# Patient Record
Sex: MALE | Race: WHITE | NOT HISPANIC OR LATINO | Employment: OTHER | URBAN - METROPOLITAN AREA
[De-identification: names, ages, dates, MRNs, and addresses within clinical notes are randomized per-mention and may not be internally consistent; named-entity substitution may affect disease eponyms.]

---

## 2022-03-23 ENCOUNTER — HOSPITAL ENCOUNTER (EMERGENCY)
Facility: HOSPITAL | Age: 77
Discharge: HOME | End: 2022-03-24
Attending: EMERGENCY MEDICINE | Admitting: EMERGENCY MEDICINE

## 2022-03-23 DIAGNOSIS — K43.9 VENTRAL HERNIA WITHOUT OBSTRUCTION OR GANGRENE: Primary | ICD-10-CM

## 2022-03-23 PROCEDURE — 99282 EMERGENCY DEPT VISIT SF MDM: CPT

## 2022-03-23 PROCEDURE — 99211 OFF/OP EST MAY X REQ PHY/QHP: CPT

## 2022-03-23 PROCEDURE — 99283 EMERGENCY DEPT VISIT LOW MDM: CPT

## 2022-03-23 RX ORDER — DULOXETIN HYDROCHLORIDE 30 MG/1
30 CAPSULE, DELAYED RELEASE ORAL DAILY
COMMUNITY

## 2022-03-23 RX ORDER — NITROGLYCERIN 0.4 MG/1
0.4 TABLET SUBLINGUAL
COMMUNITY

## 2022-03-23 RX ORDER — FUROSEMIDE 20 MG/1
20 TABLET ORAL 2 TIMES DAILY
COMMUNITY

## 2022-03-23 RX ORDER — ATORVASTATIN CALCIUM 40 MG/1
40 TABLET, FILM COATED ORAL DAILY
COMMUNITY

## 2022-03-23 RX ORDER — POTASSIUM CHLORIDE 1.5 G/1.77G
20 POWDER, FOR SOLUTION ORAL DAILY
COMMUNITY

## 2022-03-23 RX ORDER — TRAZODONE HYDROCHLORIDE 50 MG/1
50 TABLET ORAL DAILY
COMMUNITY

## 2022-03-24 VITALS
WEIGHT: 226 LBS | BODY MASS INDEX: 38.58 KG/M2 | DIASTOLIC BLOOD PRESSURE: 76 MMHG | OXYGEN SATURATION: 91 % | SYSTOLIC BLOOD PRESSURE: 114 MMHG | RESPIRATION RATE: 18 BRPM | TEMPERATURE: 97.5 F | HEIGHT: 64 IN | HEART RATE: 67 BPM

## 2022-03-24 RX ORDER — SODIUM CHLORIDE 0.9 % (FLUSH) 0.9 %
10 SYRINGE (ML) INJECTION AS NEEDED
Status: DISCONTINUED | OUTPATIENT
Start: 2022-03-24 | End: 2022-03-24 | Stop reason: HOSPADM

## 2022-03-24 NOTE — ED PROVIDER NOTES
"Time: 12:29 AM EDT  Arrived by: Wheelchair  Chief Complaint: Hernia    History of Present Illness:    Charly Pierson is a 76 y.o. male who presents to the emergency department today with complaints of a hernia. The patient reports that he has a longstanding history of hernias. He states that his hernia popped out this morning, but that he was able to pop it back in on his own. He adds that this has occurred in the past and that he has always been able to resolve it independently.    This evening PTA (now 2+ hours ago), the patient states that he \"moved funny\" and his hernia popped out again. He has been unable to fix issue on his own, which prompted his ED visit. He notes that he has been vomiting and continues to be in a moderate amount of pain. He also states that his discomfort extends from the abdomen up to the chest.    In addition to his hernia, the patient also has a history of atrial fibrillation, hypertension, skin cancer, and COPD. The patient denies smoking or drinking alcohol. There are no other acute complaints at this time.      History provided by:  Patient   used: No    Hernia  Location:  Abdomen/Chest  Quality:  Hernia popped out  Severity:  Moderate  Onset quality:  Sudden  Duration:  2 hours  Timing:  Constant  Progression:  Unchanged  Chronicity:  Recurrent  Context:  Patient has a history of hernias but is usually able to pop them back in on his own. He states that he \"moved funny\" and his hernia popped out. He has been unable to fix it on his own.  Relieved by:  Nothing  Worsened by:  Nothing  Ineffective treatments:  N/A  Associated symptoms: abdominal pain, chest pain and vomiting    Associated symptoms: no cough, no diarrhea, no fever, no rash and no shortness of breath    Risk factors:  History of hernias.      Similar Symptoms Previously: Yes.  Recently seen: No recent visits on file.      Patient Care Team  Primary Care Provider: Provider, No Known    Past Medical " History:     No Known Allergies  Past Medical History:   Diagnosis Date   • Cancer (HCC)     skin   • Chronic a-fib (HCC)    • COPD (chronic obstructive pulmonary disease) (HCC)    • Hypertension    • Umbilical hernia      Past Surgical History:   Procedure Laterality Date   • HERNIA REPAIR     • RECTAL PROLAPSE REPAIR     • SKIN CANCER EXCISION       History reviewed. No pertinent family history.    Home Medications:  Prior to Admission medications    Medication Sig Start Date End Date Taking? Authorizing Provider   apixaban (ELIQUIS) 5 MG tablet tablet Take 5 mg by mouth 2 (Two) Times a Day.    Veronica Enrique MD   atorvastatin (LIPITOR) 40 MG tablet Take 40 mg by mouth Daily.    Veronica Enrique MD   dilTIAZem (CARDIZEM) 30 MG tablet Take 30 mg by mouth Daily.    Veronica Enrique MD   DULoxetine (CYMBALTA) 30 MG capsule Take 30 mg by mouth Daily.    Veronica Enrique MD   furosemide (LASIX) 20 MG tablet Take 20 mg by mouth 2 (Two) Times a Day. 2 tablets in the morning and 1 at night    Veronica Enrique MD   nitroglycerin (NITROSTAT) 0.4 MG SL tablet Place 0.4 mg under the tongue.    Veronica Enrique MD   potassium chloride (KLOR-CON) 20 MEQ packet Take 20 mEq by mouth Daily.    Veronica Enrique MD   traZODone (DESYREL) 50 MG tablet Take 50 mg by mouth Daily.    Veronica Enrique MD        Social History:   Social History     Tobacco Use   • Smoking status: Never Smoker   • Smokeless tobacco: Never Used   Substance Use Topics   • Alcohol use: Never       Record Review:  I have reviewed the patient's records in [facility].     Review of Systems:  Review of Systems   Constitutional: Negative for chills and fever.   HENT: Negative for nosebleeds.    Eyes: Negative for redness.   Respiratory: Negative for cough and shortness of breath.    Cardiovascular: Positive for chest pain.   Gastrointestinal: Positive for abdominal pain and vomiting. Negative for diarrhea.        Patient has a  "painful hernia which he feels has popped out.   Genitourinary: Negative for dysuria and frequency.   Musculoskeletal: Negative for back pain and neck pain.   Skin: Negative for rash.   Neurological: Negative for seizures.   All other systems reviewed and are negative.       Physical Exam:  /76 (BP Location: Right arm, Patient Position: Sitting)   Pulse 67   Temp 97.5 °F (36.4 °C) (Oral)   Resp 18   Ht 162.6 cm (64\")   Wt 103 kg (226 lb)   SpO2 91%   BMI 38.79 kg/m²     Physical Exam  Vitals and nursing note reviewed.   Constitutional:       General: He is not in acute distress.     Comments: Patient appears to be comfortable.   HENT:      Head: Normocephalic and atraumatic.      Nose: Nose normal.      Mouth/Throat:      Mouth: Mucous membranes are moist.   Eyes:      General: No scleral icterus.  Cardiovascular:      Rate and Rhythm: Normal rate and regular rhythm.      Heart sounds: Normal heart sounds. No murmur heard.  Pulmonary:      Effort: No respiratory distress.      Breath sounds: Normal breath sounds.   Abdominal:      Palpations: Abdomen is soft. There is no mass.      Tenderness: There is no abdominal tenderness.      Comments: At the left mid abdominal wall, he has a palpable defect but no palpable mass or hernia. No tenderness to this region. Patient feels like his hernia is reduced.   Musculoskeletal:         General: No tenderness. Normal range of motion.      Cervical back: Normal range of motion and neck supple.      Right lower leg: No edema.      Left lower leg: No edema.   Skin:     General: Skin is warm and dry.   Neurological:      Mental Status: He is alert. Mental status is at baseline.   Psychiatric:         Behavior: Behavior normal.                Medications in the Emergency Department:  Medications   sodium chloride 0.9 % flush 10 mL (has no administration in time range)        Labs  Lab Results (last 24 hours)     ** No results found for the last 24 hours. **       "     Imaging:  No Radiology Exams Resulted Within Past 24 Hours    Procedures:  Procedures    Progress                            Medical Decision Making:  [facility]     At the time of evaluation the patient feels as though his hernia has reduced.  Unable to palpate the hernia defect in his ventral abdominal wall but there is no hernia palpable.  Patient has no tenderness.  The patient and his wife both believe that the patient's hernia has spontaneously reduced prior to my evaluation.  The patient was able to move about his room without difficulty and able to tolerate oral liquids.  He and his wife at bedside feel comfortable plan for discharge home and will follow up with general surgery.  We discussed return precautions including worsening symptoms or any additional concerns.    Final diagnoses:   Ventral hernia without obstruction or gangrene        Disposition:  ED Disposition     ED Disposition   Discharge    Condition   Stable    Comment   --             Dictated Utilizing Dragon Dictation    Documentation assistance provided by Sofia Roca acting as scribe for Erik Valentine MD. Information recorded by the scribe was done at my direction and has been verified and validated by me.      Sofia Roca  03/24/22 0104       Erik Valentine MD  03/24/22 [id number]

## 2022-03-24 NOTE — ED NOTES
Pt states his hernia has gone back in on it's own. Dr. Valentine ordered this RN to hold off on blood work and give pt po fluids to see if hernia stays in place.